# Patient Record
Sex: FEMALE | Race: WHITE | Employment: OTHER | URBAN - METROPOLITAN AREA
[De-identification: names, ages, dates, MRNs, and addresses within clinical notes are randomized per-mention and may not be internally consistent; named-entity substitution may affect disease eponyms.]

---

## 2017-05-11 ENCOUNTER — HOSPITAL ENCOUNTER (EMERGENCY)
Age: 72
Discharge: HOME | End: 2017-05-11
Attending: EMERGENCY MEDICINE
Payer: MEDICARE

## 2017-05-11 ENCOUNTER — APPOINTMENT (OUTPATIENT)
Dept: CT IMAGING | Age: 72
End: 2017-05-11
Attending: EMERGENCY MEDICINE
Payer: MEDICARE

## 2017-05-11 VITALS
SYSTOLIC BLOOD PRESSURE: 114 MMHG | HEIGHT: 66 IN | WEIGHT: 158 LBS | TEMPERATURE: 97.9 F | HEART RATE: 88 BPM | DIASTOLIC BLOOD PRESSURE: 78 MMHG | RESPIRATION RATE: 16 BRPM | OXYGEN SATURATION: 95 % | BODY MASS INDEX: 25.39 KG/M2

## 2017-05-11 DIAGNOSIS — R41.0 CONFUSION: Primary | ICD-10-CM

## 2017-05-11 LAB
ALBUMIN SERPL BCP-MCNC: 3.8 G/DL (ref 3.5–5)
ALBUMIN/GLOB SERPL: 1.2 {RATIO} (ref 1.1–2.2)
ALP SERPL-CCNC: 100 U/L (ref 45–117)
ALT SERPL-CCNC: 28 U/L (ref 12–78)
ANION GAP BLD CALC-SCNC: 7 MMOL/L (ref 5–15)
AST SERPL W P-5'-P-CCNC: 21 U/L (ref 15–37)
BASOPHILS # BLD AUTO: 0 K/UL (ref 0–0.1)
BASOPHILS # BLD: 0 % (ref 0–1)
BILIRUB SERPL-MCNC: 0.6 MG/DL (ref 0.2–1)
BUN SERPL-MCNC: 11 MG/DL (ref 6–20)
BUN/CREAT SERPL: 16 (ref 12–20)
CALCIUM SERPL-MCNC: 8.9 MG/DL (ref 8.5–10.1)
CHLORIDE SERPL-SCNC: 103 MMOL/L (ref 97–108)
CO2 SERPL-SCNC: 30 MMOL/L (ref 21–32)
CREAT SERPL-MCNC: 0.69 MG/DL (ref 0.55–1.02)
DIFFERENTIAL METHOD BLD: NORMAL
EOSINOPHIL # BLD: 0.1 K/UL (ref 0–0.4)
EOSINOPHIL NFR BLD: 2 % (ref 0–7)
ERYTHROCYTE [DISTWIDTH] IN BLOOD BY AUTOMATED COUNT: 13.2 % (ref 11.5–14.5)
GLOBULIN SER CALC-MCNC: 3.2 G/DL (ref 2–4)
GLUCOSE SERPL-MCNC: 107 MG/DL (ref 65–100)
HCT VFR BLD AUTO: 38.8 % (ref 35–47)
HGB BLD-MCNC: 13.2 G/DL (ref 11.5–16)
LYMPHOCYTES # BLD AUTO: 20 % (ref 12–49)
LYMPHOCYTES # BLD: 1.2 K/UL (ref 0.8–3.5)
MCH RBC QN AUTO: 30.4 PG (ref 26–34)
MCHC RBC AUTO-ENTMCNC: 34 G/DL (ref 30–36.5)
MCV RBC AUTO: 89.4 FL (ref 80–99)
MONOCYTES # BLD: 0.4 K/UL (ref 0–1)
MONOCYTES NFR BLD AUTO: 6 % (ref 5–13)
NEUTS SEG # BLD: 4.2 K/UL (ref 1.8–8)
NEUTS SEG NFR BLD AUTO: 72 % (ref 32–75)
PLATELET # BLD AUTO: 216 K/UL (ref 150–400)
POTASSIUM SERPL-SCNC: 3.8 MMOL/L (ref 3.5–5.1)
PROT SERPL-MCNC: 7 G/DL (ref 6.4–8.2)
RBC # BLD AUTO: 4.34 M/UL (ref 3.8–5.2)
SODIUM SERPL-SCNC: 140 MMOL/L (ref 136–145)
TROPONIN I SERPL-MCNC: <0.04 NG/ML
WBC # BLD AUTO: 5.9 K/UL (ref 3.6–11)

## 2017-05-11 PROCEDURE — 99284 EMERGENCY DEPT VISIT MOD MDM: CPT

## 2017-05-11 PROCEDURE — 70450 CT HEAD/BRAIN W/O DYE: CPT

## 2017-05-11 PROCEDURE — 36415 COLL VENOUS BLD VENIPUNCTURE: CPT | Performed by: EMERGENCY MEDICINE

## 2017-05-11 PROCEDURE — 80053 COMPREHEN METABOLIC PANEL: CPT | Performed by: EMERGENCY MEDICINE

## 2017-05-11 PROCEDURE — 84484 ASSAY OF TROPONIN QUANT: CPT | Performed by: EMERGENCY MEDICINE

## 2017-05-11 PROCEDURE — 85025 COMPLETE CBC W/AUTO DIFF WBC: CPT | Performed by: EMERGENCY MEDICINE

## 2017-05-11 RX ORDER — SODIUM CHLORIDE 0.9 % (FLUSH) 0.9 %
5-10 SYRINGE (ML) INJECTION EVERY 8 HOURS
Status: DISCONTINUED | OUTPATIENT
Start: 2017-05-11 | End: 2017-05-11 | Stop reason: HOSPADM

## 2017-05-11 RX ORDER — ICOSAPENT ETHYL 1000 MG/1
CAPSULE ORAL DAILY
COMMUNITY
End: 2019-04-01

## 2017-05-11 RX ORDER — ATORVASTATIN CALCIUM 40 MG/1
20 TABLET, FILM COATED ORAL DAILY
COMMUNITY

## 2017-05-11 RX ORDER — ASPIRIN 81 MG/1
162 TABLET ORAL DAILY
Qty: 1 TAB | Refills: 0 | Status: SHIPPED
Start: 2017-05-11 | End: 2017-08-11

## 2017-05-11 RX ORDER — SODIUM CHLORIDE 0.9 % (FLUSH) 0.9 %
5-10 SYRINGE (ML) INJECTION AS NEEDED
Status: DISCONTINUED | OUTPATIENT
Start: 2017-05-11 | End: 2017-05-11 | Stop reason: HOSPADM

## 2017-05-11 RX ORDER — ASPIRIN 81 MG/1
TABLET ORAL DAILY
COMMUNITY
End: 2017-05-11

## 2017-05-11 NOTE — ED PROVIDER NOTES
Patient is a 71 y.o. female presenting with hypertension. The history is provided by the patient and the spouse. Hypertension    This is a new problem. The current episode started yesterday. Progression since onset: improving. Associated symptoms include confusion and dizziness. Pertinent negatives include no chest pain, no headaches, no neck pain, no peripheral edema, no shortness of breath, no nausea and no vomiting. Associated symptoms comments: +memory loss. patient has some memory loss of some events from yesterday - she doesn't remember taking meat out that was found on the counter, she doesn't remember calling her  that was out 3 different times. No past medical history on file. No past surgical history on file. No family history on file. Social History     Social History    Marital status:      Spouse name: N/A    Number of children: N/A    Years of education: N/A     Occupational History    Not on file. Social History Main Topics    Smoking status: Not on file    Smokeless tobacco: Not on file    Alcohol use Not on file    Drug use: Not on file    Sexual activity: Not on file     Other Topics Concern    Not on file     Social History Narrative         ALLERGIES: Review of patient's allergies indicates no known allergies. Review of Systems   Constitutional: Negative for chills and fever. Respiratory: Negative for chest tightness and shortness of breath. Cardiovascular: Negative for chest pain. Gastrointestinal: Negative for abdominal pain, nausea and vomiting. Musculoskeletal: Negative for back pain and neck pain. Neurological: Positive for dizziness. Negative for seizures, syncope, speech difficulty, numbness and headaches. Occasional episodes of vertigo for months  Occasional episodes of left arm going to sleep at night   Psychiatric/Behavioral: Positive for confusion. All other systems reviewed and are negative.       Vitals:    05/11/17 1218 05/11/17 1231 05/11/17 1245 05/11/17 1300   BP: 183/87 131/74 143/78 128/79   Pulse: 88      Resp: 16      Temp: 97.9 °F (36.6 °C)      SpO2: 96%  97%    Weight: 71.7 kg (158 lb)      Height: 5' 6\" (1.676 m)               Physical Exam   Constitutional: She is oriented to person, place, and time. She appears well-developed and well-nourished. No distress. HENT:   Head: Normocephalic and atraumatic. Mouth/Throat: Oropharynx is clear and moist.   Eyes: Conjunctivae and EOM are normal. Pupils are equal, round, and reactive to light. Neck: Normal range of motion. Cardiovascular: Normal rate, regular rhythm, normal heart sounds and intact distal pulses. No murmur heard. Pulmonary/Chest: Effort normal and breath sounds normal. No stridor. No respiratory distress. Abdominal: Soft. Bowel sounds are normal. There is no tenderness. Musculoskeletal: Normal range of motion. She exhibits no edema or tenderness. Neurological: She is alert and oriented to person, place, and time. She has normal strength. No cranial nerve deficit or sensory deficit. Gait normal. GCS eye subscore is 4. GCS verbal subscore is 5. GCS motor subscore is 6. Skin: Skin is warm and dry. She is not diaphoretic. Psychiatric: She has a normal mood and affect. Nursing note and vitals reviewed. MDM  Number of Diagnoses or Management Options  Diagnosis management comments: Patient with confusion and some memory gaps from yesterday - no hx of HTN, but her BP is up. Check labs and head CT and re-eval.    1345 - patient improved, CT shows old lacunar infarct, no ICB noted.   Patient on aspirin daily, will advise close f/u with neurology and to return to the ED is symptoms return or now problems arise       Amount and/or Complexity of Data Reviewed  Clinical lab tests: reviewed and ordered  Tests in the radiology section of CPT®: ordered and reviewed  Obtain history from someone other than the patient: yes (spouse)  Independent visualization of images, tracings, or specimens: yes    Risk of Complications, Morbidity, and/or Mortality  Presenting problems: high  Diagnostic procedures: high  Management options: high    Patient Progress  Patient progress: stable    ED Course       Procedures    Final result (Exam End: 5/11/2017  1:09 PM) Open        Study Result      EXAM: CT HEAD WO CONT     INDICATION: confusion yesterday, eval for CVA     COMPARISON: None.     TECHNIQUE: Unenhanced CT of the head was performed using 5 mm images. Brain and  bone windows were generated. CT dose reduction was achieved through use of a  standardized protocol tailored for this examination and automatic exposure  control for dose modulation. Adaptive statistical iterative reconstruction  (ASIR) was utilized.     FINDINGS:  There is no extra-axial fluid collection hemorrhage or shift. There is mild  atrophy and white matter disease. Small anterior basal lacunar appear remote. No  masses or.     IMPRESSION  impression: No acute findings seen.

## 2017-05-11 NOTE — ED NOTES
Pt passed dysphagia screening without difficulty. Pt given warm blanket. All testing completed at this time and results available for review. Pt resting on stretcher with  at bedside. Call bell within reach. Will continue to monitor.

## 2017-05-11 NOTE — ED NOTES
The patient was discharged home by Dr. Xochitl Sandhu and Stanford Xie RN in stable condition. The patient is alert and oriented, is in no respiratory distress. The patient's diagnosis, condition and treatment were explained to patient or parent/guardian. The patient/responsible party expressed understanding. No prescriptions given to pt. No work/school note given to pt. A discharge plan has been developed. A  was not involved in the process. Aftercare instructions were given to the patient.

## 2017-05-11 NOTE — ED TRIAGE NOTES
Per the pt she was confused yesterday, experienced episodes of loss of memory. Pt also reports that her blood pressure has been elevated.

## 2017-05-11 NOTE — DISCHARGE INSTRUCTIONS
We hope that we have addressed all of your medical concerns. The examination and treatment you received in the Emergency Department were for an emergent problem and were not intended as complete care. It is important that you follow up with your healthcare provider(s) for ongoing care. If your symptoms worsen or do not improve as expected, and you are unable to reach your usual health care provider(s), you should return to the Emergency Department. Today's healthcare is undergoing tremendous change, and patient satisfaction surveys are one of the many tools to assess the quality of medical care. You may receive a survey from the [employer] regarding your experience in the Emergency Department. I hope that your experience has been completely positive, particularly the medical care that I provided. As such, please participate in the survey; anything less than excellent does not meet my expectations or intentions. [facility] and [address] [place] [address] participate in nationally recognized quality of care measures. If your blood pressure is greater than 120/80, as reported below, we urge that you seek medical care to address the potential of high blood pressure, commonly known as hypertension. Hypertension can be hereditary or can be caused by certain medical conditions, pain, stress, or \"white coat syndrome. \"       Please make an appointment with your health care provider(s) for follow up of your Emergency Department visit. VITALS:   Patient Vitals for the past 8 hrs:   Temp Pulse Resp BP SpO2   05/11/17 1300 - - - 128/79 -   05/11/17 1245 - - - 143/78 97 %   05/11/17 1231 - - - 131/74 -   05/11/17 1218 97.9 °F (36.6 °C) 88 16 183/87 96 %          Thank you for allowing us to provide you with medical care today. We realize that you have many choices for your emergency care needs.   Please choose us in the future for any continued health care alexsandra.      [facility], [address] [place] [address] [address]   Office: [phone]            Recent Results (from the past 24 hour(s))   CBC WITH AUTOMATED DIFF    Collection Time: 05/11/17 12:51 PM   Result Value Ref Range    WBC 5.9 3.6 - 11.0 K/uL    RBC 4.34 3.80 - 5.20 M/uL    HGB 13.2 11.5 - 16.0 g/dL    HCT 38.8 35.0 - 47.0 %    MCV 89.4 80.0 - 99.0 FL    MCH 30.4 26.0 - 34.0 PG    MCHC 34.0 30.0 - 36.5 g/dL    RDW 13.2 11.5 - 14.5 %    PLATELET 889 616 - 709 K/uL    NEUTROPHILS 72 32 - 75 %    LYMPHOCYTES 20 12 - 49 %    MONOCYTES 6 5 - 13 %    EOSINOPHILS 2 0 - 7 %    BASOPHILS 0 0 - 1 %    ABS. NEUTROPHILS 4.2 1.8 - 8.0 K/UL    ABS. LYMPHOCYTES 1.2 0.8 - 3.5 K/UL    ABS. MONOCYTES 0.4 0.0 - 1.0 K/UL    ABS. EOSINOPHILS 0.1 0.0 - 0.4 K/UL    ABS. BASOPHILS 0.0 0.0 - 0.1 K/UL    DF AUTOMATED     TROPONIN I    Collection Time: 05/11/17 12:51 PM   Result Value Ref Range    Troponin-I, Qt. <0.04 <1.40 ng/mL   METABOLIC PANEL, COMPREHENSIVE    Collection Time: 05/11/17 12:51 PM   Result Value Ref Range    Sodium 140 136 - 145 mmol/L    Potassium 3.8 3.5 - 5.1 mmol/L    Chloride 103 97 - 108 mmol/L    CO2 30 21 - 32 mmol/L    Anion gap 7 5 - 15 mmol/L    Glucose 107 (H) 65 - 100 mg/dL    BUN PENDING MG/DL    Creatinine 0.69 0.55 - 1.02 MG/DL    BUN/Creatinine ratio PENDING     GFR est AA >60 >60 ml/min/1.73m2    GFR est non-AA >60 >60 ml/min/1.73m2    Calcium 8.9 8.5 - 10.1 MG/DL    Bilirubin, total 0.6 0.2 - 1.0 MG/DL    ALT (SGPT) 28 12 - 78 U/L    AST (SGOT) 21 15 - 37 U/L    Alk. phosphatase 100 45 - 117 U/L    Protein, total 7.0 6.4 - 8.2 g/dL    Albumin 3.8 3.5 - 5.0 g/dL    Globulin 3.2 2.0 - 4.0 g/dL    A-G Ratio 1.2 1.1 - 2.2         Ct Head Wo Cont    Result Date: 5/11/2017  EXAM:  CT HEAD WO CONT INDICATION:   confusion yesterday, eval for CVA COMPARISON: None. TECHNIQUE: Unenhanced CT of the head was performed using 5 mm images.  Brain and bone windows were generated. CT dose reduction was achieved through use of a standardized protocol tailored for this examination and automatic exposure control for dose modulation. Adaptive statistical iterative reconstruction (ASIR) was utilized. FINDINGS: There is no extra-axial fluid collection hemorrhage or shift. There is mild atrophy and white matter disease. Small anterior basal lacunar appear remote. No masses or. impression: No acute findings seen.

## 2017-05-22 ENCOUNTER — OFFICE VISIT (OUTPATIENT)
Dept: NEUROLOGY | Age: 72
End: 2017-05-22

## 2017-05-22 VITALS
BODY MASS INDEX: 25.71 KG/M2 | SYSTOLIC BLOOD PRESSURE: 132 MMHG | HEART RATE: 76 BPM | DIASTOLIC BLOOD PRESSURE: 74 MMHG | HEIGHT: 66 IN | WEIGHT: 160 LBS | OXYGEN SATURATION: 98 %

## 2017-05-22 DIAGNOSIS — G45.4 TRANSIENT GLOBAL AMNESIA: Primary | ICD-10-CM

## 2017-05-22 DIAGNOSIS — Z86.79 HX OF CAROTID STENOSIS: ICD-10-CM

## 2017-05-22 DIAGNOSIS — R42 VERTIGO: ICD-10-CM

## 2017-05-22 RX ORDER — VITAMIN E 268 MG
CAPSULE ORAL DAILY
COMMUNITY
End: 2019-04-01

## 2017-05-22 RX ORDER — PETROLATUM,WHITE/LANOLIN
OINTMENT (GRAM) TOPICAL 3 TIMES DAILY
COMMUNITY
End: 2019-04-01

## 2017-05-22 RX ORDER — ASPIRIN 81 MG/1
TABLET ORAL DAILY
COMMUNITY
End: 2021-08-20

## 2017-05-22 NOTE — PROGRESS NOTES
Name:  Tyrese Conley      :  1945    PCP:   None      Referring:  Dr. Jhoana Cristina ER  MRN:   [id number]    Chief Complaint:   Chief Complaint   Patient presents with    [facility] Follow Up       HISTORY OF PRESENT ILLNESS:     This is a 71 y.o. female who presents for ER follow up regarding a confusional spell. History obtained per discussion with , patient, and reviewing ER note from 17. Patient/  have been visiting family, travelling through [place] in their RV.  says that on 5-10-17 he told wife he was going out to get some med records at the [place]. She called him in the ealr afternoon, and he told her where he was. She didn't remember being told then he says he sounded confused. He was on his way back home and she says he called her twice more and asked him same questions. Then called him once and asked if he had taken out some frozen food where she didn't remember. She recalls feeling confused. When he got home, she seemed back to normal.  SBP was elevated 190. She estimates that the confusion lasted about 30 minutes and she doesn't remember anything that happened during that time other than what  has told her. In the ER she had a CT of her head which showed a few small lacunar infarcts, of which she was unaware. Reportedly had a carotid doppler done 1 year ago and that showed some mild carotid narrowing on both sides. Separate issues is that she has numbness/ tingling in left hand that she occasionally wakes up with, gets better with shaking out. Over past 1 year, she's had some episodes of spinning when turning from left side to right side, lasting very brief/ few seconds. She does have ELIE and uses her CPAP machine.       Complete Review of Systems: constipation, ELIE, occasional vertigo; otherwise as noted in HPI     No Known Allergies  Past Medical History:   Diagnosis Date    Hypertension      Current Outpatient Prescriptions   Medication Sig Dispense Refill    losartan 50 mg tab 100 mg, hydroCHLOROthiazide 12.5 mg cap 12.5 mg Take  by mouth daily. 0.5 tab daily      ATORVASTATIN CALCIUM (ATORVASTATIN PO) Take  by mouth.  ICOSAPENT ETHYL (VASCEPA PO) Take  by mouth.  vitamin E (AQUA GEMS) 400 unit capsule Take  by mouth daily.  aspirin delayed-release 81 mg tablet Take  by mouth daily.  glucosamine sulfate 500 mg capsule Take  by mouth three (3) times daily.  multivitamins-calcium carb chew Take  by mouth.  atorvastatin (LIPITOR) 40 mg tablet Take 40 mg by mouth daily.  icosapent ethyl (VASCEPA) 1 gram capsule Take  by mouth daily.  aspirin delayed-release 81 mg tablet Take 2 Tabs by mouth daily. 1 Tab 0     History reviewed. No pertinent surgical history. Family History   Problem Relation Age of Onset    Stroke Mother     Cancer Sister      Social History     Social History    Marital status:      Spouse name: N/A    Number of children: N/A    Years of education: N/A     Occupational History    Not on file. Social History Main Topics    Smoking status: Former Smoker     Types: Cigarettes    Smokeless tobacco: Not on file    Alcohol use Yes      Comment: 2 glasses of wine per day    Drug use: Not on file    Sexual activity: Not on file     Other Topics Concern    Not on file     Social History Narrative    No narrative on file       PHYSICAL EXAM  Vitals:    05/22/17 1050   BP: 132/74   BP 1 Location: Left arm   BP Patient Position: Sitting   Pulse: 76   SpO2: 98%   Weight: 72.6 kg (160 lb)   Height: 5' 6\" (1.676 m)       General:  Alert, cooperative, NAD   Head:  Normocephalic, atraumatic. Eyes:  Conjunctivae/corneas clear   Lungs:  Heart:  Non labored breathing  Regular rate, rhythm   Extremities: No edema.    Skin: No rashes    Neurologic Exam       Language: normal  Memory:  Alert, oriented to person, place, situation    Cranial Nerves:  I: smell Not tested   II: visual fields Full to confrontation   II: pupils Equal, round, reactive to light   II: optic disc Not examined   III,VII: ptosis none   III,IV,VI: extraocular muscles  normal   V: facial light touch sensation  normal   VII: facial muscle function  symmetric   VIII: hearing symmetric   IX: soft palate elevation  normal   XI: sternocleidomastoid strength 5/5   XII: tongue  midline      Motor: normal bulk, tone, strength in all exts  Sensory: intact to LT, PP, temp, vibration x 4 exts   Cerebellar: no rest, postural, or intention tremor  Normal FNF and H-Shin bilaterally  Reflexes: 2+ biceps, patellars, and achilles (symmetric)   Plantar response: neutral bilaterally    Gait: normal gait including tandem  Romberg negative     Reviewed ER notes from 5-11-17  Reviewed CT head images    ASSESSMENT AND PLAN    ICD-10-CM ICD-9-CM    1. Transient global amnesia G45.4 437.7 EEG AMB NEURO      EEG AMB NEURO   2. Vertigo R42 780.4 CTA HEAD NECK W CONT   3. Hx of carotid stenosis Z86.79 V12.59 CTA HEAD NECK W CONT       71 y.o. female with episode on 5-10-17 of 15-30 minutes of confusion. Pt has no recollection of what happened during that time frame. No other symptoms such as facial droop, extremity weakness or numbness, or slurred speech that would suggest it was a TIA. Discussed with patient- that her symptoms sound more like Transient Global Amnesia, which has no relationship to TIA or stroke. However due to complaint of vertigo and report of last carotid doppler showing mild carotid disease, will check CTA head/ neck to be sure no significant vertebrobasilar (or carotid) narrowing. Will check EEG for the episode of TGA. Pt to call back a few days after tests are complete for results.

## 2017-05-25 ENCOUNTER — HOSPITAL ENCOUNTER (OUTPATIENT)
Dept: CT IMAGING | Age: 72
Discharge: HOME | End: 2017-05-25
Attending: PSYCHIATRY & NEUROLOGY
Payer: MEDICARE

## 2017-05-25 DIAGNOSIS — R42 VERTIGO: ICD-10-CM

## 2017-05-25 DIAGNOSIS — Z86.79 HX OF CAROTID STENOSIS: ICD-10-CM

## 2017-05-25 PROCEDURE — 74011636320 HC RX REV CODE- 636/320: Performed by: RADIOLOGY

## 2017-05-25 PROCEDURE — 70498 CT ANGIOGRAPHY NECK: CPT

## 2017-05-25 RX ADMIN — IOPAMIDOL 100 ML: 755 INJECTION, SOLUTION INTRAVENOUS at 14:25

## 2017-05-26 ENCOUNTER — TELEPHONE (OUTPATIENT)
Dept: NEUROLOGY | Age: 72
End: 2017-05-26

## 2017-05-26 NOTE — TELEPHONE ENCOUNTER
----- Message from Ronette Councilman sent at 5/26/2017 12:37 PM EDT -----  Regarding: /Telephone  Pt is requesting a callback in regards to getting the exeresis chart and results from a MRI and CT scan uploaded to my chart if possible or a callback with the results. Best contact [phone] or [phone].

## 2017-05-31 ENCOUNTER — DOCUMENTATION ONLY (OUTPATIENT)
Dept: NEUROLOGY | Age: 72
End: 2017-05-31

## 2017-05-31 NOTE — PROCEDURES
Procedure:   Routine EEG     Location:   [facility]  Date of Recordin17    Date of Interpretation:    17  Requesting provider:   Melany Landa MD    Interpreting physician: Melany Landa MD      Introduction: This is a 71 y.o. female with an episode of prolonged confusion on 5-10-17 that is suspected to have been transient global amnesia. Current medications: losartan, atorvastatin, vascepa, aqua gems, vitamins    Technical:   This study was recorded in multiple montages using a digital EEG machine according to the 10-20 international placement system. Interpretation:   The patient was described as awake, resting during this recording. The background is low to medium in amplitude and the PDR is 9-10 Hz rhythm. Photic stimulation resulted in symmetric driving response,  Hyperventilation was not done. The background rhythm waxed and waned suggesting drowsiness. Sleep was not recorded. There were no focal abnormalities, epileptiform discharges, or electrographic seizures. The single lead EKG did not reveal any abnormalities. Impression: This is a normal awake and drowsy EEG recording.          Melany Landa MD  [facility]

## 2017-06-01 NOTE — TELEPHONE ENCOUNTER
Contacted patient and informed her of results. She voiced understanding and will call back if any further questions or concerns.

## 2017-06-01 NOTE — TELEPHONE ENCOUNTER
Let patient know that EEG was normal and CT angiogram did not show any significant stenosis of her carotid arteries or other arteries in the head or neck.

## 2017-08-11 ENCOUNTER — HOSPITAL ENCOUNTER (EMERGENCY)
Age: 72
Discharge: HOME | End: 2017-08-11
Attending: EMERGENCY MEDICINE
Payer: MEDICARE

## 2017-08-11 VITALS
SYSTOLIC BLOOD PRESSURE: 158 MMHG | OXYGEN SATURATION: 96 % | RESPIRATION RATE: 16 BRPM | BODY MASS INDEX: 25.88 KG/M2 | HEART RATE: 92 BPM | HEIGHT: 66 IN | DIASTOLIC BLOOD PRESSURE: 88 MMHG | TEMPERATURE: 98.2 F | WEIGHT: 161 LBS

## 2017-08-11 DIAGNOSIS — L98.9 ARM SKIN LESION, LEFT: Primary | ICD-10-CM

## 2017-08-11 PROCEDURE — 99283 EMERGENCY DEPT VISIT LOW MDM: CPT

## 2017-08-11 PROCEDURE — 74011250637 HC RX REV CODE- 250/637: Performed by: EMERGENCY MEDICINE

## 2017-08-11 RX ADMIN — BACITRACIN, NEOMYCIN, POLYMYXIN B 1 PACKET: 400; 3.5; 5 OINTMENT TOPICAL at 14:59

## 2017-08-11 NOTE — DISCHARGE INSTRUCTIONS
We hope that we have addressed all of your medical concerns. The examination and treatment you received in the Emergency Department were for an emergent problem and were not intended as complete care. It is important that you follow up with your healthcare provider(s) for ongoing care. If your symptoms worsen or do not improve as expected, and you are unable to reach your usual health care provider(s), you should return to the Emergency Department. Today's healthcare is undergoing tremendous change, and patient satisfaction surveys are one of the many tools to assess the quality of medical care. You may receive a survey from the [employer] regarding your experience in the Emergency Department. I hope that your experience has been completely positive, particularly the medical care that I provided. As such, please participate in the survey; anything less than excellent does not meet my expectations or intentions. Thank you for allowing us to provide you with medical care today. We realize that you have many choices for your emergency care needs. Please choose us in the future for any continued health care needs. BeMD MEREDITH Forrester [facility]: [phone]            No results found for this or any previous visit (from the past 24 hour(s)). No results found.

## 2017-08-11 NOTE — ED PROVIDER NOTES
HPI Comments: 72 yo WF with hx xol, htn presents with c/o skin lesion. Pt reports has had this mole under her arm for many years. Today noticed it was slightly red and was worried it could be cancer. Her and her  reports hearing horror stories of people being diagnosed with cancer and  Are concerned because they are leaving for [place] in their camper tomorrow for a few months. Pt was hoping it could be frozen off. She does note recently shaving her underarms prior to the area of redness    Patient is a 71 y.o. female presenting with skin problem. The history is provided by the patient and the spouse. Skin Problem           Past Medical History:   Diagnosis Date    Hypertension        History reviewed. No pertinent surgical history. Family History:   Problem Relation Age of Onset    Stroke Mother     Cancer Sister        Social History     Social History    Marital status:      Spouse name: N/A    Number of children: N/A    Years of education: N/A     Occupational History    Not on file. Social History Main Topics    Smoking status: Former Smoker     Types: Cigarettes    Smokeless tobacco: Never Used    Alcohol use Yes      Comment: 2 glasses of wine per day    Drug use: Not on file    Sexual activity: Not on file     Other Topics Concern    Not on file     Social History Narrative         ALLERGIES: Review of patient's allergies indicates no known allergies. Review of Systems   Constitutional: Negative for fever. Skin:        lesion   All other systems reviewed and are negative. Vitals:    08/11/17 1448   BP: 158/88   Pulse: 92   Resp: 16   Temp: 98.2 °F (36.8 °C)   SpO2: 96%   Weight: 73 kg (161 lb)   Height: 5' 6\" (1.676 m)            Physical Exam   Constitutional: She is oriented to person, place, and time. She appears well-developed and well-nourished. No distress. HENT:   Head: Normocephalic and atraumatic.    Eyes: Conjunctivae are normal.   Neck: Normal range of motion. Cardiovascular: Normal rate, regular rhythm, normal heart sounds and intact distal pulses. Exam reveals no friction rub. No murmur heard. Pulmonary/Chest: Effort normal and breath sounds normal. No respiratory distress. She has no wheezes. She has no rales. Abdominal: Soft. Bowel sounds are normal. She exhibits no distension. There is no tenderness. There is no rebound and no guarding. Musculoskeletal: Normal range of motion. Neurological: She is alert and oriented to person, place, and time. Coordination normal.   Skin: Skin is warm and dry. She is not diaphoretic. No pallor. Left underarm with 1 cm area raised skin - cauliflower like lesion with small area of erythema at 12 oclock- no drianage    Psychiatric: She has a normal mood and affect. Her behavior is normal.   Nursing note and vitals reviewed. MDM  Number of Diagnoses or Management Options  Arm skin lesion, left:   Diagnosis management comments: Spoke with pt and  at length. Concern is with lesions that change size, shape and color. Pt reports this looks the same other than the minimal area of redness. Advised they take a pic with a ruler next to it in their phone and keep an eye on it. If it changes in size, shape or color they need to be seen urgently.         Amount and/or Complexity of Data Reviewed  Obtain history from someone other than the patient: yes ()    Patient Progress  Patient progress: stable    ED Course       Procedures

## 2017-08-11 NOTE — ED TRIAGE NOTES
Patient ambulatory to ED treatment area with steady gait for complaint of \"I have had a mole under my left arm for awhile now but over the past 4 to 5 days it has become painful and more red. \" Patient denies any recent injury to the area.

## 2017-08-11 NOTE — ED NOTES
Pt left ER prior to receiving discharge paperwork. Pt called left message with follow up information.

## 2019-04-01 ENCOUNTER — APPOINTMENT (OUTPATIENT)
Dept: GENERAL RADIOLOGY | Age: 74
End: 2019-04-01
Attending: EMERGENCY MEDICINE
Payer: MEDICARE

## 2019-04-01 ENCOUNTER — HOSPITAL ENCOUNTER (EMERGENCY)
Age: 74
Discharge: HOME | End: 2019-04-01
Attending: EMERGENCY MEDICINE
Payer: MEDICARE

## 2019-04-01 VITALS
HEART RATE: 69 BPM | TEMPERATURE: 97.5 F | RESPIRATION RATE: 15 BRPM | OXYGEN SATURATION: 97 % | WEIGHT: 165.12 LBS | BODY MASS INDEX: 27.51 KG/M2 | HEIGHT: 65 IN | DIASTOLIC BLOOD PRESSURE: 88 MMHG | SYSTOLIC BLOOD PRESSURE: 169 MMHG

## 2019-04-01 DIAGNOSIS — S63.502A LEFT WRIST SPRAIN, INITIAL ENCOUNTER: Primary | ICD-10-CM

## 2019-04-01 PROCEDURE — 73110 X-RAY EXAM OF WRIST: CPT

## 2019-04-01 PROCEDURE — 99283 EMERGENCY DEPT VISIT LOW MDM: CPT

## 2019-04-01 PROCEDURE — L3908 WHO COCK-UP NONMOLDE PRE OTS: HCPCS

## 2019-04-01 RX ORDER — SPIRONOLACTONE 25 MG/1
25 TABLET ORAL DAILY
COMMUNITY

## 2019-04-01 NOTE — ED PROVIDER NOTES
This very pleasant right-handed patient presents with an injury to her left wrist sustained yesterday. She fell off of a stool backwards with outstretched hands. No head injury, according to her . She tried 2 Aleve this morning which gave good relief of the pain. There is some swelling to the wrist. She thinks that she broke this wrist one time in the past as a teenager. No other injury. No numbness or tingling to her fingers on that hand. No injury to the left shoulder, humerus, elbow or forearm. Left hand does not hurt. Pain is moderate right now. She has no amnesia to the event. No loss of consciousness according to the . She was drinking alcohol the time. She had been drinking wine at a winery outside [place]. Old chart reviewed - last ED visit was for a skin lesion. Also has had TGA in the past. 
 
 
  
 
Past Medical History:  
Diagnosis Date  Hypertension History reviewed. No pertinent surgical history. Family History:  
Problem Relation Age of Onset  Stroke Mother  Cancer Sister Social History Socioeconomic History  Marital status:  Spouse name: Not on file  Number of children: Not on file  Years of education: Not on file  Highest education level: Not on file Occupational History  Not on file Social Needs  Financial resource strain: Not on file  Food insecurity:  
  Worry: Not on file Inability: Not on file  Transportation needs:  
  Medical: Not on file Non-medical: Not on file Tobacco Use  Smoking status: Former Smoker Types: Cigarettes  Smokeless tobacco: Never Used Substance and Sexual Activity  Alcohol use: Yes Comment: 2 glasses of wine per day  Drug use: Not on file  Sexual activity: Not on file Lifestyle  Physical activity:  
  Days per week: Not on file Minutes per session: Not on file  Stress: Not on file Relationships  Social connections: Talks on phone: Not on file Gets together: Not on file Attends Jehovah's witness service: Not on file Active member of club or organization: Not on file Attends meetings of clubs or organizations: Not on file Relationship status: Not on file  Intimate partner violence:  
  Fear of current or ex partner: Not on file Emotionally abused: Not on file Physically abused: Not on file Forced sexual activity: Not on file Other Topics Concern  Not on file Social History Narrative  Not on file ALLERGIES: Patient has no known allergies. Review of Systems Vitals:  
 04/01/19 1108 BP: 169/88 Pulse: 69 Resp: 15 Temp: 97.5 °F (36.4 °C) SpO2: 97% Weight: 74.9 kg (165 lb 2 oz) Height: 5' 5\" (1.651 m) Physical Exam  
Constitutional: She appears well-developed and well-nourished. No distress. Cardiovascular: Regular rhythm and intact distal pulses. Pulmonary/Chest: Effort normal.  
Musculoskeletal:  
     Left shoulder: Normal.  
     Left elbow: Normal.  
     Left wrist: She exhibits decreased range of motion, tenderness, bony tenderness, swelling and effusion. She exhibits no crepitus and no deformity. Left upper arm: Normal.  
     Left forearm: Normal.  
     Left hand: Normal.  
No snuffbox tenderness. No pain with axial loading of thumb. Neurological: She is alert. Skin: Skin is warm and dry. Capillary refill takes less than 2 seconds. She is not diaphoretic. Psychiatric: She has a normal mood and affect. Distal nv exam normal LUE. MDM Procedures Reviewed xrays Doubt occult scaphoid fx 
velco wrist immobilizer Hand f/u prn D/w pt and  Went over [address] [facility]

## 2019-04-01 NOTE — ED TRIAGE NOTES
Patient ambulatory to ED treatment area with steady gait, accompanied by , for complaint of \"I fell off a stool yesterday when I went  a kleenex and I fell. I felt like I bent my left wrist backwards. \" Pt took aleve

## 2019-04-01 NOTE — DISCHARGE INSTRUCTIONS
Patient Education   Patient Education        Wrist Sprain: Care Instructions  Your Care Instructions    Your wrist hurts because you have stretched or torn ligaments, which connect the bones in your wrist.  Wrist sprains usually take from 2 to 10 weeks to heal, but some take longer. Usually, the more pain you have, the more severe your wrist sprain is and the longer it will take to heal. You can heal faster and regain strength in your wrist with good home treatment. Follow-up care is a key part of your treatment and safety. Be sure to make and go to all appointments, and call your doctor if you are having problems. It's also a good idea to know your test results and keep a list of the medicines you take. How can you care for yourself at home? · Prop up your arm on a pillow when you ice it or anytime you sit or lie down for the next 3 days. Try to keep your wrist above the level of your heart. This will help reduce swelling. · Put ice or cold packs on your wrist for 10 to 20 minutes at a time. Try to do this every 1 to 2 hours for the next 3 days (when you are awake) or until the swelling goes down. Put a thin cloth between the ice pack and your skin. · After 2 or 3 days, if your swelling is gone, apply a heating pad set on low or a warm cloth to your wrist. This helps keep your wrist flexible. Some doctors suggest that you go back and forth between hot and cold. · If you have an elastic bandage, keep it on for the next 24 to 36 hours. The bandage should be snug but not so tight that it causes numbness or tingling. To rewrap the wrist, wrap the bandage around the hand a few times, beginning at the fingers. Then wrap it around the hand between the thumb and index finger, ending by circling the wrist several times. · If your doctor gave you a splint or brace, wear it as directed to protect your wrist until it has healed. · Take pain medicines exactly as directed.   ? If the doctor gave you a prescription medicine for pain, take it as prescribed. ? If you are not taking a prescription pain medicine, ask your doctor if you can take an over-the-counter medicine. · Try not to use your injured wrist and hand. When should you call for help? Call your doctor now or seek immediate medical care if:    · Your hand or fingers are cool or pale or change color.    Watch closely for changes in your health, and be sure to contact your doctor if:    · Your pain gets worse.     · Your wrist has not improved after 1 week. Where can you learn more? Go to http://crystal-jayce.info/. Enter G541 in the search box to learn more about \"Wrist Sprain: Care Instructions. \"  Current as of: September 20, 2018  Content Version: 11.9  © 6916-0537 [employer]. Care instructions adapted under license by [employer] (which disclaims liability or warranty for this information). If you have questions about a medical condition or this instruction, always ask your healthcare professional. [place] [address] any warranty or liability for your use of this information. Wrist Sprain: Rehab Exercises  Your Care Instructions  Here are some examples of typical rehabilitation exercises for your condition. Start each exercise slowly. Ease off the exercise if you start to have pain. Your doctor or your physical or occupational therapist will tell you when you can start these exercises and which ones will work best for you. How to do the exercises  Resisted wrist extension    1. Sit leaning forward with your legs slightly spread. Then place your forearm on your thigh with your affected hand and wrist in front of your knee. 2. Grasp one end of an exercise band with your palm down. Step on the other end.  3. Slowly bend your wrist upward for a count of 2. Then lower your wrist slowly to a count of 5.  4. Repeat 8 to 12 times. Resisted wrist flexion    1.  Sit leaning forward with your legs slightly spread. Then place your forearm on your thigh with your affected hand and wrist in front of your knee. 2. Grasp one end of an exercise band with your palm up. Step on the other end.  3. Slowly bend your wrist upward for a count of 2. Then lower your wrist slowly to a count of 5.  4. Repeat 8 to 12 times. Resisted radial deviation    1. Sit leaning forward with your legs slightly spread. Then place your forearm on your thigh with your affected hand and wrist in front of your knee. 2. Grasp one end of an exercise band with your hand facing toward your other thigh. Step on the other end.  3. Slowly bend your wrist upward for a count of 2. Then lower your wrist slowly to a count of 5.  4. Repeat 8 to 12 times. Resisted ulnar deviation    1. Sit leaning forward with your legs slightly spread. Then place your forearm on your thigh with your affected hand and wrist by the inside of your knee. 2. Grasp one end of an exercise band with your palm down. Step on the other end with the foot opposite the hand holding the band. 3. Slowly bend your wrist outward and toward your knee for a count of 2. Then slowly move your wrist back to the starting position to a count of 5.  4. Repeat 8 to 12 times. Resisted forearm pronation    1. Sit leaning forward with your legs slightly spread. Then place your forearm on your thigh with your affected hand and wrist in front of your knee. 2. Grasp one end of an exercise band with your palm up. Step on the other end. 3. Keeping your wrist straight, roll your palm inward toward your thigh for a count of 2. Then slowly move your wrist back to the starting position to a count of 5.  4. Repeat 8 to 12 times. Resisted supination    1. Sit leaning forward with your legs slightly spread. Then place your forearm on your thigh with your affected hand and wrist in front of your knee. 2. Grasp one end of an exercise band with your palm down.  Step on the other end.  3. Keeping your wrist straight, roll your palm outward and away from your thigh for a count of 2. Then slowly move your wrist back to the starting position to a count of 5.  4. Repeat 8 to 12 times. Follow-up care is a key part of your treatment and safety. Be sure to make and go to all appointments, and call your doctor if you are having problems. It's also a good idea to know your test results and keep a list of the medicines you take. Where can you learn more? Go to http://crystal-jayce.info/. Enter S110 in the search box to learn more about \"Wrist Sprain: Rehab Exercises. \"  Current as of: September 20, 2018  Content Version: 11.9  © 7643-5879 [employer]. Care instructions adapted under license by [employer] (which disclaims liability or warranty for this information). If you have questions about a medical condition or this instruction, always ask your healthcare professional. [address] any warranty or liability for your use of this information.

## 2019-04-23 ENCOUNTER — HOSPITAL ENCOUNTER (EMERGENCY)
Age: 74
Discharge: HOME | End: 2019-04-23
Attending: EMERGENCY MEDICINE
Payer: MEDICARE

## 2019-04-23 VITALS
WEIGHT: 163.58 LBS | BODY MASS INDEX: 26.29 KG/M2 | HEIGHT: 66 IN | HEART RATE: 79 BPM | OXYGEN SATURATION: 96 % | RESPIRATION RATE: 16 BRPM | SYSTOLIC BLOOD PRESSURE: 182 MMHG | TEMPERATURE: 97.9 F | DIASTOLIC BLOOD PRESSURE: 89 MMHG

## 2019-04-23 DIAGNOSIS — T23.262A PARTIAL THICKNESS BURN OF BACK OF LEFT HAND, INITIAL ENCOUNTER: Primary | ICD-10-CM

## 2019-04-23 PROCEDURE — 75810000057 HC BURN CR DRS/DEB SM<5%TBSA

## 2019-04-23 PROCEDURE — 99283 EMERGENCY DEPT VISIT LOW MDM: CPT

## 2019-04-23 PROCEDURE — 74011000250 HC RX REV CODE- 250: Performed by: PHYSICIAN ASSISTANT

## 2019-04-23 PROCEDURE — 74011250637 HC RX REV CODE- 250/637: Performed by: PHYSICIAN ASSISTANT

## 2019-04-23 RX ORDER — SILVER SULFADIAZINE 10 G/1000G
CREAM TOPICAL
Status: COMPLETED | OUTPATIENT
Start: 2019-04-23 | End: 2019-04-23

## 2019-04-23 RX ORDER — SILVER SULFADIAZINE 10 G/1000G
CREAM TOPICAL 2 TIMES DAILY
Qty: 50 G | Refills: 0 | Status: SHIPPED | OUTPATIENT
Start: 2019-04-23 | End: 2022-06-29

## 2019-04-23 RX ORDER — NAPROXEN 250 MG/1
500 TABLET ORAL
Status: COMPLETED | OUTPATIENT
Start: 2019-04-23 | End: 2019-04-23

## 2019-04-23 RX ADMIN — NAPROXEN 500 MG: 250 TABLET ORAL at 15:32

## 2019-04-23 RX ADMIN — SILVER SULFADIAZINE: 10 CREAM TOPICAL at 15:34

## 2019-04-23 NOTE — ED PROVIDER NOTES
67 y/o female with PMHx of HTN, presenting with complaint of burn. The patient states that 1 week ago she was making tea and accidentally spilled some of the hot water on her left hand. She has been treating the burn at home with antibiotic ointment and \"Wound Be Gone,\" but today she saw her cardiologist and was advised to come to the ED for further evaluation of her burn. She states the pain has been improving and currently denies any pain. No fevers, drainage from wound, weakness or numbness. The history is provided by the patient. Past Medical History:  
Diagnosis Date  Hypertension History reviewed. No pertinent surgical history. Family History:  
Problem Relation Age of Onset  Stroke Mother  Cancer Sister Social History Socioeconomic History  Marital status:  Spouse name: Not on file  Number of children: Not on file  Years of education: Not on file  Highest education level: Not on file Occupational History  Not on file Social Needs  Financial resource strain: Not on file  Food insecurity:  
  Worry: Not on file Inability: Not on file  Transportation needs:  
  Medical: Not on file Non-medical: Not on file Tobacco Use  Smoking status: Former Smoker Types: Cigarettes  Smokeless tobacco: Never Used Substance and Sexual Activity  Alcohol use: Yes Comment: 2 glasses of wine per day  Drug use: Not on file  Sexual activity: Not on file Lifestyle  Physical activity:  
  Days per week: Not on file Minutes per session: Not on file  Stress: Not on file Relationships  Social connections:  
  Talks on phone: Not on file Gets together: Not on file Attends [employer] service: Not on file Active member of club or organization: Not on file Attends meetings of clubs or organizations: Not on file Relationship status: Not on file  Intimate partner violence: Fear of current or ex partner: Not on file Emotionally abused: Not on file Physically abused: Not on file Forced sexual activity: Not on file Other Topics Concern  Not on file Social History Narrative  Not on file ALLERGIES: Patient has no known allergies. Review of Systems Constitutional: Negative for chills and fever. HENT: Negative for congestion. Respiratory: Negative for cough. Gastrointestinal: Negative for diarrhea, nausea and vomiting. Musculoskeletal: Negative for arthralgias and myalgias. Skin: Positive for wound (left hand burn). Neurological: Negative for weakness and numbness. All other systems reviewed and are negative. Vitals:  
 04/23/19 1508 BP: 182/89 Pulse: 79 Resp: 16 Temp: 97.9 °F (36.6 °C) SpO2: 96% Weight: 74.2 kg (163 lb 9.3 oz) Height: 5' 6\" (1.676 m) Physical Exam  
Constitutional: She is oriented to person, place, and time. She appears well-developed and well-nourished. No distress. HENT:  
Head: Normocephalic and atraumatic. Eyes: Conjunctivae and EOM are normal.  
Neck: Normal range of motion. Neck supple. Cardiovascular: Normal rate. Pulmonary/Chest: Effort normal. No respiratory distress. Neurological: She is alert and oriented to person, place, and time. Skin: Skin is warm and dry. She is not diaphoretic. Dorsum of left hand with burn approx 3 cm x 3 cm, healthy granulation tissue present. No drainage or macerated tissue. No surrounding swelling, erythema or increased warmth. Nursing note and vitals reviewed. MDM Number of Diagnoses or Management Options Partial thickness burn of back of left hand, initial encounter:  
  
Amount and/or Complexity of Data Reviewed Discuss the patient with other providers: yes (Dr. Silverio Johnson, ED attending) Patient Progress Patient progress: stable Procedures 67 y/o female with PMHx of HTN, presenting with complaint of burn. Physical exam reveals partial thickness burn, healing well without evidence of infection. Wound irrigated, dressed with silvadene cream. Safe for discharge home with Rx for silvadene and instructions for PCP follow up in 1 week. Strict ED return precautions discussed and provided in writing at time of discharge. The patient verbalized understanding and agreement with this plan.

## 2019-04-23 NOTE — ED NOTES
Wound on left hand cleansed with surecleans and patted dry. Silvadene applied to patient's wound and covered with Telfa dressing. Secured with gauze. Patient tolerated well. Neurovascular status intact. The patient was discharged home by provider in stable condition. The patient is alert and oriented, in no respiratory distress and discharge vital signs obtained. The patient's diagnosis, condition and treatment were explained. The patient expressed understanding. No prescriptions given. No work/school note given. A discharge plan has been developed. A  was not involved in the process. Aftercare instructions were given. Pt ambulatory out of the ED.

## 2019-04-23 NOTE — ED TRIAGE NOTES
Pt reports spilling hot water on her left hand while making tea about a week ago. Pt reports putting \"wound be gone\" without relief.

## 2020-07-04 ENCOUNTER — APPOINTMENT (OUTPATIENT)
Dept: GENERAL RADIOLOGY | Age: 75
End: 2020-07-04
Attending: EMERGENCY MEDICINE
Payer: MEDICARE

## 2020-07-04 ENCOUNTER — HOSPITAL ENCOUNTER (EMERGENCY)
Age: 75
Discharge: HOME | End: 2020-07-04
Attending: EMERGENCY MEDICINE
Payer: MEDICARE

## 2020-07-04 VITALS
HEIGHT: 66 IN | WEIGHT: 155 LBS | BODY MASS INDEX: 24.91 KG/M2 | RESPIRATION RATE: 16 BRPM | DIASTOLIC BLOOD PRESSURE: 88 MMHG | HEART RATE: 67 BPM | TEMPERATURE: 98.6 F | SYSTOLIC BLOOD PRESSURE: 138 MMHG | OXYGEN SATURATION: 95 %

## 2020-07-04 DIAGNOSIS — M25.461 SWELLING OF RIGHT KNEE JOINT: Primary | ICD-10-CM

## 2020-07-04 DIAGNOSIS — M25.461 EFFUSION OF RIGHT KNEE: ICD-10-CM

## 2020-07-04 LAB
APPEARANCE FLD: ABNORMAL
BODY FLD TYPE: NORMAL
COLOR FLD: YELLOW
CRYSTALS FLD MICRO: NORMAL
LYMPHOCYTES NFR FLD: 2 %
MONOS+MACROS NFR FLD: 8 %
NEUTROPHILS NFR FLD: 90 %
NUC CELL # FLD: ABNORMAL /CU MM
RBC # FLD: >100 /CU MM
SPECIMEN SOURCE FLD: ABNORMAL

## 2020-07-04 PROCEDURE — 87205 SMEAR GRAM STAIN: CPT

## 2020-07-04 PROCEDURE — 74011000250 HC RX REV CODE- 250: Performed by: EMERGENCY MEDICINE

## 2020-07-04 PROCEDURE — 75810000054 HC ARTHOCENTISIS JOINT

## 2020-07-04 PROCEDURE — 89060 EXAM SYNOVIAL FLUID CRYSTALS: CPT

## 2020-07-04 PROCEDURE — 89050 BODY FLUID CELL COUNT: CPT

## 2020-07-04 PROCEDURE — 99283 EMERGENCY DEPT VISIT LOW MDM: CPT

## 2020-07-04 PROCEDURE — 73562 X-RAY EXAM OF KNEE 3: CPT

## 2020-07-04 RX ORDER — LIDOCAINE HYDROCHLORIDE 10 MG/ML
10 INJECTION, SOLUTION EPIDURAL; INFILTRATION; INTRACAUDAL; PERINEURAL ONCE
Status: COMPLETED | OUTPATIENT
Start: 2020-07-04 | End: 2020-07-04

## 2020-07-04 RX ADMIN — LIDOCAINE HYDROCHLORIDE 10 ML: 10 INJECTION, SOLUTION EPIDURAL; INFILTRATION; INTRACAUDAL; PERINEURAL at 13:39

## 2020-07-04 NOTE — DISCHARGE INSTRUCTIONS
Patient Education        Learning About Joint Effusion  What is it? Fluid is normally found in joints such as knees, hips, and elbows. When too much fluid builds up around a joint in your body, it's called joint effusion. When you have this problem, your joint may look swollen. What causes it? Many things can cause fluid buildup in a joint. It may be caused by a condition like osteoarthritis, rheumatoid arthritis, or gout. It may also happen because of an infection. Or it can happen because of an injury, like a twisting fall. What are the symptoms? You might feel pain when you try to straighten a joint where you have fluid buildup. Your joint may be stiff or swollen. How is it diagnosed? Your doctor will do a physical exam. You may need an X-ray. You may need other imaging tests, like an MRI or a CT scan. Your doctor may remove some fluid from your joint to learn more. This is called aspiration. It's done by using a needle to drain fluid from your joint. How is it treated? Your doctor may suggest rest, ice, and raising the joint (elevation) to help with pain and swelling. The fluid might be drained from the area. Your doctor may suggest using nonprescription anti-inflammatory drugs (NSAIDs) or getting a steroid shot. Or you may need surgery to repair damage. Follow-up care is a key part of your treatment and safety. Be sure to make and go to all appointments, and call your doctor if you are having problems. It's also a good idea to know your test results and keep a list of the medicines you take. Current as of: January 15, 2020               Content Version: 12.5  © 2006-2020 [employer]. Care instructions adapted under license by [employer] (which disclaims liability or warranty for this information).  If you have questions about a medical condition or this instruction, always ask your healthcare professional. [place] [address] any warranty or liability for your use of this information.

## 2020-07-04 NOTE — ED PROVIDER NOTES
HPI       70y F here with R knee pain and swelling. Has been doing physical therapy exercises for her L knee where it is felt she has a partially torn meniscus. She decided to do the exercises on the R leg yesterday. When she woke today, the R knee was very swollen which made it painful to move. She took 400mg motrin which seemed to help some. No fever. No redness around the joint or skin. When she is at rest she can move the knee. Most of the pain is when she is bearing weight. No new injury or trauma.     Past Medical History:   Diagnosis Date    Hypertension        Past Surgical History:   Procedure Laterality Date    HX HYSTERECTOMY           Family History:   Problem Relation Age of Onset    Stroke Mother     Cancer Sister        Social History     Socioeconomic History    Marital status:      Spouse name: Not on file    Number of children: Not on file    Years of education: Not on file    Highest education level: Not on file   Occupational History    Not on file   Social Needs    Financial resource strain: Not on file    Food insecurity     Worry: Not on file     Inability: Not on file    Transportation needs     Medical: Not on file     Non-medical: Not on file   Tobacco Use    Smoking status: Former Smoker     Types: Cigarettes    Smokeless tobacco: Never Used   Substance and Sexual Activity    Alcohol use: Yes     Comment: 2 glasses of wine per day    Drug use: Not on file    Sexual activity: Not on file   Lifestyle    Physical activity     Days per week: Not on file     Minutes per session: Not on file    Stress: Not on file   Relationships    Social connections     Talks on phone: Not on file     Gets together: Not on file     Attends Jehovah's witness service: Not on file     Active member of club or organization: Not on file     Attends meetings of clubs or organizations: Not on file     Relationship status: Not on file    Intimate partner violence     Fear of current or ex partner: Not on file     Emotionally abused: Not on file     Physically abused: Not on file     Forced sexual activity: Not on file   Other Topics Concern    Not on file   Social History Narrative    Not on file         ALLERGIES: Patient has no known allergies. Review of Systems  Review of Systems   Constitutional: (-) weight loss. HEENT: (-) stiff neck   Eyes: (-) discharge. Respiratory: (-) cough. Cardiovascular: (-) syncope. Gastrointestinal: (-) blood in stool. Genitourinary: (-) hematuria. Musculoskeletal: (-) myalgias. Neurological: (-) seizure. Skin: (-) petechiae  Lymph/Immunologic: (-) enlarged lymph nodes  All other systems reviewed and are negative. Vitals:    07/04/20 1310   BP: 155/86   Pulse: 79   Resp: 16   Temp: 98.6 °F (37 °C)   SpO2: 97%   Weight: 70.3 kg (155 lb)   Height: 5' 6\" (1.676 m)            Physical Exam Nursing note and vitals reviewed. Constitutional: oriented to person, place, and time. appears well-developed and well-nourished. No distress. Head: Normocephalic and atraumatic. Sclera anicteric  Nose: No rhinorrhea  Mouth/Throat: Oropharynx is clear and moist. Pharynx normal  Eyes: Conjunctivae are normal. Pupils are equal, round, and reactive to light. Right eye exhibits no discharge. Left eye exhibits no discharge. Neck: Painless normal range of motion. Neck supple. No LAD. Cardiovascular: Normal rate, regular rhythm, normal heart sounds and intact distal pulses. Exam reveals no gallop and no friction rub. No murmur heard. Pulmonary/Chest:  No respiratory distress. No wheezes. No rales. No rhonchi. No increased work of breathing. No accessory muscle use. Good air exchange throughout. Abdominal: soft, non-tender, no rebound or guarding. No hepatosplenomegaly. Normal bowel sounds throughout.   Back: no tenderness to palpation, no deformities, no CVA tenderness  Extremities/Musculoskeletal: swelling present in the R knee, most prominently in the suprapatellar region. No warmth. No erythema. Distal extremities are neurovasc intact. Lymphadenopathy:   No adenopathy. Neurological:  Alert and oriented to person, place, and time. Coordination normal. CN 2-12 intact. Motor and sensory function intact. Skin: Skin is warm and dry. No rash noted. No pallor. MDM 70y F here with R knee pain. Likely from an effusion as a result of increased activity yesterday. Will check xray and discuss aspiration for relief. Does not look infected. Does not sound like gout. Arthrocentesis  Date/Time: 7/4/2020 2:06 PM  Performed by: Roseline French MD  Authorized by: Roseline French MD     Consent:     Consent obtained:  Written    Consent given by:  Patient    Risks discussed:  Bleeding, infection, pain and incomplete drainage    Alternatives discussed:  No treatment  Location:     Location:  Knee  Anesthesia (see MAR for exact dosages): Anesthesia method:  Local infiltration    Local anesthetic:  Lidocaine 1% w/o epi  Procedure details:     Preparation: Patient was prepped and draped in usual sterile fashion      Needle gauge:  18 G    Ultrasound guidance: no      Approach:  Lateral    Aspirate amount:  20 mL    Aspirate characteristics:  Serous    Steroid injected: no      Specimen collected: yes    Post-procedure details:     Dressing:  Sterile dressing    Patient tolerance of procedure: Tolerated well, no immediate complications          7:23 PM  Pt did not want to wait for results of knee aspiration so was discharged. She was feeling better with reduced swelling in the knee. Able to move it freely. No warmth or redness. By history and physical this did not seem c/w septic arthritis. Cell count showed 30K WBC. Gram stain did not show organisms. Culture pending. Discussed with ortho - did not feel that this was representative of septic arthritis.

## 2020-07-04 NOTE — ED NOTES
The patient was discharged home by provider in stable condition. The patient is alert and oriented, in no respiratory distress and discharge vital signs obtained. The patient's diagnosis, condition and treatment were explained. The patient expressed understanding. No prescriptions given. No work/school note given. A discharge plan has been developed. A  was not involved in the process. Aftercare instructions were given. Pt discharged from the ED via w/c by Sp Jacobs RN with family. Patient can be reached at [phone] with any pending results if needed.

## 2020-07-04 NOTE — ED NOTES
Dr. Tash Orellana at bedside to perform aspiration of knee. Consent signed and all equipment at bedside.

## 2020-07-04 NOTE — ED TRIAGE NOTES
Patient presents to treatment area via wheelchair. Patient complains of right knee pain and swelling. Patient says pain began this morning when she awoke. Patient states the only thing different she did was to do exercises on the right knee as she does for her left. Patient partial weight bearing to right knee.

## 2020-07-08 LAB
BACTERIA SPEC CULT: NORMAL
BACTERIA SPEC CULT: NORMAL
GRAM STN SPEC: NORMAL
GRAM STN SPEC: NORMAL
SERVICE CMNT-IMP: NORMAL

## 2021-08-20 ENCOUNTER — HOSPITAL ENCOUNTER (EMERGENCY)
Age: 76
Discharge: HOME | End: 2021-08-20
Attending: STUDENT IN AN ORGANIZED HEALTH CARE EDUCATION/TRAINING PROGRAM
Payer: MEDICARE

## 2021-08-20 ENCOUNTER — APPOINTMENT (OUTPATIENT)
Dept: GENERAL RADIOLOGY | Age: 76
End: 2021-08-20
Attending: STUDENT IN AN ORGANIZED HEALTH CARE EDUCATION/TRAINING PROGRAM
Payer: MEDICARE

## 2021-08-20 VITALS
WEIGHT: 154.1 LBS | BODY MASS INDEX: 24.87 KG/M2 | SYSTOLIC BLOOD PRESSURE: 144 MMHG | TEMPERATURE: 98.7 F | RESPIRATION RATE: 20 BRPM | OXYGEN SATURATION: 93 % | HEART RATE: 85 BPM | DIASTOLIC BLOOD PRESSURE: 91 MMHG

## 2021-08-20 DIAGNOSIS — Z20.822 SUSPECTED COVID-19 VIRUS INFECTION: Primary | ICD-10-CM

## 2021-08-20 LAB — SARS-COV-2, COV2: NORMAL

## 2021-08-20 PROCEDURE — 99283 EMERGENCY DEPT VISIT LOW MDM: CPT

## 2021-08-20 PROCEDURE — U0003 INFECTIOUS AGENT DETECTION BY NUCLEIC ACID (DNA OR RNA); SEVERE ACUTE RESPIRATORY SYNDROME CORONAVIRUS 2 (SARS-COV-2) (CORONAVIRUS DISEASE [COVID-19]), AMPLIFIED PROBE TECHNIQUE, MAKING USE OF HIGH THROUGHPUT TECHNOLOGIES AS DESCRIBED BY CMS-2020-01-R: HCPCS

## 2021-08-20 PROCEDURE — 71045 X-RAY EXAM CHEST 1 VIEW: CPT

## 2021-08-20 NOTE — ED NOTES
Pt was discharged and given instructions by Dr Rodriguez Memos . Pt verbalized good understanding of all discharge instructions, and F/U care. All questions answered. Pt in stable condition on discharge.

## 2021-08-20 NOTE — ED TRIAGE NOTES
Pt ambulated to the treatment area with a steady gait. Pt states \"I think my  has covid and I have had a cough for 3 days I do not have any other symptoms and I feel fine I thought I should be checked. \" Pt appears in no distress at this time.

## 2021-08-21 LAB
SARS-COV-2, XPLCVT: DETECTED
SOURCE, COVRS: ABNORMAL

## 2021-08-22 NOTE — PROGRESS NOTES
Spoke with patient and discussed +COVID result. She is doing well, no new or worsening symptoms. Orderded a pulse ox. Advised her to contact her PCP tomorrow to discuss Regeneron infusion as she is likely considered high risk. She agrees to contact PCP tomorrow to discuss. Return precautions discussed.

## 2021-08-23 ENCOUNTER — PATIENT OUTREACH (OUTPATIENT)
Dept: CASE MANAGEMENT | Age: 76
End: 2021-08-23

## 2021-08-23 NOTE — ED PROVIDER NOTES
Patient is a 61-year-old female present emergency department with concerns of Covid. Patient comes with her  who she states \"I think my  has Covid and she has had a cough for the last 3 days. Patient has no other symptoms including fevers, chills, body aches, diarrhea. Patient did not get vaccinated states that she will not be getting vaccinated. Past Medical History:   Diagnosis Date    Hypertension        Past Surgical History:   Procedure Laterality Date    HX HYSTERECTOMY           Family History:   Problem Relation Age of Onset    Stroke Mother     Cancer Sister        Social History     Socioeconomic History    Marital status:      Spouse name: Not on file    Number of children: Not on file    Years of education: Not on file    Highest education level: Not on file   Occupational History    Not on file   Tobacco Use    Smoking status: Former Smoker     Types: Cigarettes    Smokeless tobacco: Never Used   Substance and Sexual Activity    Alcohol use: Yes     Comment: 2 glasses of wine per day    Drug use: Not on file    Sexual activity: Not on file   Other Topics Concern    Not on file   Social History Narrative    Not on file     Social Determinants of Health     Financial Resource Strain:     Difficulty of Paying Living Expenses:    Food Insecurity:     Worried About [address] in the Last Year:     [address] in the Last Year:    Transportation Needs:     Lack of Transportation (Medical):      Lack of Transportation (Non-Medical):    Physical Activity:     Days of Exercise per Week:     Minutes of Exercise per Session:    Stress:     Feeling of Stress :    Social Connections:     Frequency of Communication with Friends and Family:     Frequency of Social Gatherings with Friends and Family:     Attends Yarsani Services:     Active Member of Clubs or Organizations:     Attends Club or Organization Meetings:     Marital Status: Intimate Partner Violence:     Fear of Current or Ex-Partner:     Emotionally Abused:     Physically Abused:     Sexually Abused: ALLERGIES: Patient has no known allergies. Review of Systems   Constitutional: Negative for chills, diaphoresis, fatigue and fever. Respiratory: Positive for cough. Cardiovascular: Negative for chest pain. All other systems reviewed and are negative. Vitals:    08/20/21 1013 08/20/21 1014 08/20/21 1040 08/20/21 1100   BP:  139/82  (!) 144/91   Pulse:  85     Resp:  20     Temp:  98.7 °F (37.1 °C)     SpO2:  93% 96% 93%   Weight: 69.9 kg (154 lb 1.6 oz)               Physical Exam  Vitals and nursing note reviewed. Constitutional:       Appearance: Normal appearance. HENT:      Head: Normocephalic and atraumatic. Eyes:      Extraocular Movements: Extraocular movements intact. Pupils: Pupils are equal, round, and reactive to light. Cardiovascular:      Rate and Rhythm: Normal rate and regular rhythm. Pulses: Normal pulses. Heart sounds: Normal heart sounds. Pulmonary:      Effort: Pulmonary effort is normal.      Breath sounds: Normal breath sounds. Abdominal:      General: Abdomen is flat. Palpations: Abdomen is soft. Musculoskeletal:         General: Normal range of motion. Cervical back: Normal range of motion and neck supple. Skin:     General: Skin is warm and dry. Neurological:      General: No focal deficit present. Mental Status: She is alert and oriented to person, place, and time. Psychiatric:         Mood and Affect: Mood normal.         Behavior: Behavior normal.          MDM  Number of Diagnoses or Management Options  Suspected COVID-19 virus infection  Diagnosis management comments: A/P:  COVID, URI. 25-year-old female present emergency department with concerns of Covid as her  has Covid symptoms. Will obtain chest x-ray, Covid swab.          Procedures    Janet Cam was evaluated in the Emergency Department on 8/20/2021 for the symptoms described in the history of present illness. He/she was evaluated in the context of the global COVID-19 pandemic, which necessitated consideration that the patient might be at risk for infection with the SARS-CoV-2 virus that causes COVID-19. Institutional protocols and algorithms that pertain to the evaluation of patients at risk for COVID-19 are in a state of rapid change based on information released by regulatory bodies including the CDC and federal and state organizations. These policies and algorithms were followed during the patient's care in the ED.

## 2021-08-23 NOTE — PROGRESS NOTES
Patient contacted regarding COVID-19 diagnosis. Discussed COVID-19 related testing which was available at this time. Test results were positive. Patient informed of results, if available? yes. LPN Care Coordinator contacted the patient by telephone to perform post discharge assessment. Call within 2 business days of discharge: Yes Verified name and  with patient as identifiers. Provided introduction to self, and explanation of the CTN/ACM role, and reason for call due to risk factors for infection and/or exposure to COVID-19. Symptoms reviewed with patient who verbalized the following symptoms: no new symptoms and no worsening symptoms      Due to no new or worsening symptoms encounter was not routed to provider for escalation. Discussed follow-up appointments. If no appointment was previously scheduled, appointment scheduling offered:  no. [facility] follow up appointment(s): No future appointments. Non-[facility] follow up appointment(s): na         Advance Care Planning:   Does patient have an Advance Directive: decision makers updated. Educated patient about risk for severe COVID-19 due to risk factors according to CDC guidelines. LPN CC reviewed discharge instructions, medical action plan and red flag symptoms with the patient who verbalized understanding. Discussed COVID vaccination status: yes, patient has not been cavvinated. Education provided on COVID-19 vaccination as appropriate. Discussed exposure protocols and quarantine with CDC Guidelines. Patient was given an opportunity to verbalize any questions and concerns and agrees to contact LPN CC or health care provider for questions related to their healthcare. Reviewed and educated patient on any new and changed medications related to discharge diagnosis     Was patient discharged with a pulse oximeter? no Discussed and confirmed pulse oximeter discharge instructions and when to notify provider or seek emergency care.     LPN CC provided contact information. Plan for follow-up call in 14 days based on severity of symptoms and risk factors.

## 2021-09-07 ENCOUNTER — PATIENT OUTREACH (OUTPATIENT)
Dept: CASE MANAGEMENT | Age: 76
End: 2021-09-07

## 2021-09-07 NOTE — PROGRESS NOTES
Patient resolved from Transition of Care episode on 9/7/21. ACM/CTN was unsuccessful at contacting this patient today. Patient/family was provided the following resources and education related to COVID-19 during the initial call:                         Signs, symptoms and red flags related to COVID-19            CDC exposure and quarantine guidelines            [employer] exposure contact - [phone]            Contact for their local Department of Health                 Patient has not had any additional ED or hospital visits. No further outreach scheduled with this CTN/ACM. Episode of Care resolved. Patient has this CTN/ACM contact information if future needs arise.

## 2022-06-29 ENCOUNTER — APPOINTMENT (OUTPATIENT)
Dept: ULTRASOUND IMAGING | Age: 77
End: 2022-06-29
Attending: EMERGENCY MEDICINE
Payer: MEDICARE

## 2022-06-29 ENCOUNTER — APPOINTMENT (OUTPATIENT)
Dept: GENERAL RADIOLOGY | Age: 77
End: 2022-06-29
Attending: EMERGENCY MEDICINE
Payer: MEDICARE

## 2022-06-29 ENCOUNTER — HOSPITAL ENCOUNTER (EMERGENCY)
Age: 77
Discharge: HOME | End: 2022-06-29
Attending: EMERGENCY MEDICINE
Payer: MEDICARE

## 2022-06-29 VITALS
SYSTOLIC BLOOD PRESSURE: 134 MMHG | BODY MASS INDEX: 24.91 KG/M2 | TEMPERATURE: 97 F | RESPIRATION RATE: 18 BRPM | DIASTOLIC BLOOD PRESSURE: 73 MMHG | OXYGEN SATURATION: 95 % | WEIGHT: 154.32 LBS | HEART RATE: 71 BPM

## 2022-06-29 DIAGNOSIS — M71.20 SYNOVIAL CYST OF POPLITEAL SPACE, UNSPECIFIED LATERALITY: Primary | ICD-10-CM

## 2022-06-29 PROCEDURE — 93971 EXTREMITY STUDY: CPT

## 2022-06-29 PROCEDURE — 99284 EMERGENCY DEPT VISIT MOD MDM: CPT

## 2022-06-29 PROCEDURE — 73562 X-RAY EXAM OF KNEE 3: CPT

## 2022-06-29 RX ORDER — TRAMADOL HYDROCHLORIDE 50 MG/1
50 TABLET ORAL
Qty: 10 TABLET | Refills: 0 | Status: SHIPPED | OUTPATIENT
Start: 2022-06-29 | End: 2022-07-04

## 2022-06-29 NOTE — ED NOTES
Pt was discharged and given instructions by Dr Thaddeus Martinez . Pt verbalized good understanding of all discharge instructions,prescriptions and F/U care. All questions answered. Pt in stable condition on discharge. Pt accompanied home by her .

## 2022-06-29 NOTE — ED TRIAGE NOTES
Pt was wheeled to the treatment area accompanied by her . Pt states \"I started having left leg pain behind the knee yesterday then this morning my knee is very swollen and I cant bare weight on it.  There was no injury that I know of.\"

## 2022-06-29 NOTE — ED PROVIDER NOTES
HPI   The patient is a 52-year-old white female who presents to the emergency room with increased swelling especially behind the left calf and left popliteal fossa for approximately 1 to 2 days. She denies any acute trauma associated with that area. Past Medical History:   Diagnosis Date    Hypertension        Past Surgical History:   Procedure Laterality Date    HX HYSTERECTOMY           Family History:   Problem Relation Age of Onset    Stroke Mother     Cancer Sister        Social History     Socioeconomic History    Marital status:      Spouse name: Not on file    Number of children: Not on file    Years of education: Not on file    Highest education level: Not on file   Occupational History    Not on file   Tobacco Use    Smoking status: Former Smoker     Types: Cigarettes    Smokeless tobacco: Never Used   Substance and Sexual Activity    Alcohol use: Yes     Comment: 2 glasses of wine per day    Drug use: Never    Sexual activity: Not on file   Other Topics Concern    Not on file   Social History Narrative    Not on file     Social Determinants of Health     Financial Resource Strain:     Difficulty of Paying Living Expenses: Not on file   Food Insecurity:     Worried About [address] in the Last Year: Not on file    [employer] in the Last Year: Not on file   Transportation Needs:     Lack of Transportation (Medical): Not on file    Lack of Transportation (Non-Medical):  Not on file   Physical Activity:     Days of Exercise per Week: Not on file    Minutes of Exercise per Session: Not on file   Stress:     Feeling of Stress : Not on file   Social Connections:     Frequency of Communication with Friends and Family: Not on file    Frequency of Social Gatherings with Friends and Family: Not on file    Attends Alevism Services: Not on file    Active Member of Clubs or Organizations: Not on file    Attends Club or Organization Meetings: Not on file    Marital Status: Not on file   Intimate Partner Violence:     Fear of Current or Ex-Partner: Not on file    Emotionally Abused: Not on file    Physically Abused: Not on file    Sexually Abused: Not on file   Housing Stability:     Unable to Pay for Housing in the Last Year: Not on file    Number of [place] in the Last Year: Not on file    Unstable Housing in the Last Year: Not on file         ALLERGIES: Patient has no known allergies. Review of Systems   All other systems reviewed and are negative. Vitals:    06/29/22 1349   BP: (!) 140/67   Pulse: 71   Resp: 18   Temp: 97 °F (36.1 °C)   SpO2: 95%   Weight: 70 kg (154 lb 5.2 oz)            Physical Exam  Vitals reviewed. HENT:      Nose: Nose normal.   Musculoskeletal:         General: Normal range of motion. Comments: Tenderness with some swelling posterior popliteal fossa and calf   Skin:     General: Skin is warm. Neurological:      General: No focal deficit present. Mental Status: She is alert. Mental status is at baseline. Psychiatric:         Mood and Affect: Mood normal.         Thought Content:  Thought content normal.         Judgment: Judgment normal.          MDM       Procedures

## 2024-03-15 NOTE — ED NOTES
Synovial fluid sent to lab for ordered testing. ACE wrap applied. Patient tolerated procedure and splinting well. Spouse remains at bedside. Enida